# Patient Record
Sex: MALE | Race: WHITE | NOT HISPANIC OR LATINO | Employment: OTHER | URBAN - METROPOLITAN AREA
[De-identification: names, ages, dates, MRNs, and addresses within clinical notes are randomized per-mention and may not be internally consistent; named-entity substitution may affect disease eponyms.]

---

## 2017-01-03 ENCOUNTER — TELEPHONE (OUTPATIENT)
Dept: OPHTHALMOLOGY | Facility: CLINIC | Age: 72
End: 2017-01-03

## 2017-01-03 ENCOUNTER — OFFICE VISIT (OUTPATIENT)
Dept: OTOLARYNGOLOGY | Facility: CLINIC | Age: 72
End: 2017-01-03
Payer: COMMERCIAL

## 2017-01-03 VITALS
BODY MASS INDEX: 21.64 KG/M2 | DIASTOLIC BLOOD PRESSURE: 71 MMHG | TEMPERATURE: 99 F | SYSTOLIC BLOOD PRESSURE: 157 MMHG | WEIGHT: 150.81 LBS | HEART RATE: 81 BPM

## 2017-01-03 DIAGNOSIS — J32.0 CHRONIC MAXILLARY SINUSITIS: ICD-10-CM

## 2017-01-03 DIAGNOSIS — H91.90 PERCEIVED HEARING LOSS: ICD-10-CM

## 2017-01-03 DIAGNOSIS — H92.01 OTALGIA, RIGHT: Primary | ICD-10-CM

## 2017-01-03 DIAGNOSIS — M26.621 ARTHRALGIA OF RIGHT TEMPOROMANDIBULAR JOINT: ICD-10-CM

## 2017-01-03 DIAGNOSIS — Z94.4 HISTORY OF LIVER TRANSPLANT: ICD-10-CM

## 2017-01-03 PROCEDURE — 3078F DIAST BP <80 MM HG: CPT | Mod: S$GLB,,, | Performed by: ORTHOPAEDIC SURGERY

## 2017-01-03 PROCEDURE — 1125F AMNT PAIN NOTED PAIN PRSNT: CPT | Mod: S$GLB,,, | Performed by: ORTHOPAEDIC SURGERY

## 2017-01-03 PROCEDURE — 99999 PR PBB SHADOW E&M-EST. PATIENT-LVL III: CPT | Mod: PBBFAC,,, | Performed by: ORTHOPAEDIC SURGERY

## 2017-01-03 PROCEDURE — 1157F ADVNC CARE PLAN IN RCRD: CPT | Mod: S$GLB,,, | Performed by: ORTHOPAEDIC SURGERY

## 2017-01-03 PROCEDURE — 3077F SYST BP >= 140 MM HG: CPT | Mod: S$GLB,,, | Performed by: ORTHOPAEDIC SURGERY

## 2017-01-03 PROCEDURE — 1160F RVW MEDS BY RX/DR IN RCRD: CPT | Mod: S$GLB,,, | Performed by: ORTHOPAEDIC SURGERY

## 2017-01-03 PROCEDURE — 99214 OFFICE O/P EST MOD 30 MIN: CPT | Mod: S$GLB,,, | Performed by: ORTHOPAEDIC SURGERY

## 2017-01-03 PROCEDURE — 1159F MED LIST DOCD IN RCRD: CPT | Mod: S$GLB,,, | Performed by: ORTHOPAEDIC SURGERY

## 2017-01-03 RX ORDER — NEOMYCIN SULFATE, POLYMYXIN B SULFATE AND HYDROCORTISONE 10; 3.5; 1 MG/ML; MG/ML; [USP'U]/ML
3 SUSPENSION/ DROPS AURICULAR (OTIC) 3 TIMES DAILY
Qty: 10 ML | Refills: 0 | Status: SHIPPED | OUTPATIENT
Start: 2017-01-03 | End: 2017-01-17
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
## 2017-01-03 NOTE — MR AVS SNAPSHOT
[facility]  ENT  [address]  [place] [address]  Phone: [phone]  Fax: [phone]                  Abel Beckham   1/3/2017 4:00 PM   Office Visit    Description:  Male : 1945   Provider:  Arlette Swann MD   Department:  [facility] - ENT           Reason for Visit     Otalgia           Diagnoses this Visit        Comments    Otalgia, right    -  Primary     Arthralgia of right temporomandibular joint         Perceived hearing loss         History of liver transplant         Chronic maxillary sinusitis                To Do List           Future Appointments        Provider Department Dept Phone    2017 11:00 AM Indira Goodman [facility] - Audiology [phone]    1/10/2017 4:00 PM Natalio Richards MD [facility] [phone]    2017 11:15 AM Arlette Swann MD [facility] [phone]    2017 2:00 PM Alexis Morel MD [facility] - Rheumatology [phone]    5/10/2017 1:30 PM Harriet Westfall MD [facility] [phone]      Goals (5 Years of Data)     None       These Medications        Disp Refills Start End    neomycin-polymyxin-hydrocortisone (CORTISPORIN) 3.5-10,000-1 mg/mL-unit/mL-% otic suspension 10 mL 0 1/3/2017 2017    Place 3 drops into the right ear 3 (three) times daily. - Right Ear    Pharmacy: [facility] -  [facility]. Ph #: [phone]         [facility]     [facility] -  Assistance  Registered nurses in the [facility] provide clinical advisement, health education, appointment booking, and other advisory services.  Call for this free service at [phone].             Medications           Message regarding Medications     Verify the changes and/or additions to your medication regime listed below are the same as discussed with your clinician today.  If any of these changes or additions are incorrect, please notify  "your healthcare provider.        START taking these NEW medications        Refills    neomycin-polymyxin-hydrocortisone (CORTISPORIN) 3.5-10,000-1 mg/mL-unit/mL-% otic suspension 0    Sig: Place 3 drops into the right ear 3 (three) times daily.    Class: Normal    Route: Right Ear           Verify that the below list of medications is an accurate representation of the medications you are currently taking.  If none reported, the list may be blank. If incorrect, please contact your healthcare provider. Carry this list with you in case of emergency.           Current Medications     amlodipine (NORVASC) 10 MG tablet Take 10 mg by mouth once daily.    buPROPion (WELLBUTRIN) 100 MG tablet Take 1 tablet (100 mg total) by mouth once daily.    chlorhexidine (HIBICLENS) 4 % external liquid Apply topically daily as needed.    cyanocobalamin 1,000 mcg/mL injection 1000 micrograms subcutaneously daily x7 days, once weekly x4 weeks and once monthly for life.    DIPHENHYDRAMINE HCL (BENADRYL ORAL) Take 1 tablet by mouth as needed.    fludrocortisone (FLORINEF) 0.1 mg Tab Take 1 tablet (100 mcg total) by mouth once daily.    gabapentin (NEURONTIN) 300 MG capsule 2400 per day with max 3600    glucagon (human recombinant) inj 1mg/mL kit Inject 1 mL (1 mg total) into the muscle as needed (for low blood sugar).    indomethacin (INDOCIN) 25 MG capsule Take 1 capsule (25 mg total) by mouth 3 (three) times daily with meals.    insulin aspart (NOVOLOG) 100 unit/mL InPn pen Inject 9 Units into the skin 3 (three) times daily with meals. Plus sliding scale.    insulin detemir (LEVEMIR FLEXTOUCH) 100 unit/mL (3 mL) SubQ InPn pen Inject 21 Units into the skin every evening.    insulin needles, disposable, (BD ULTRA-FINE CONSUELO PEN NEEDLES) 32 x 5/32 " Ndle Inject 1 each into the skin 4 (four) times daily.    insulin NPH human recomb 100 unit/mL (3 mL) InPn Inject 9 Units into the skin every evening.    KIONEX 15 gram/60 mL Susp as needed.     " "multivitamin (THERAGRAN) tablet Take 1 tablet by mouth once daily.    mycophenolate (MYFORTIC) 180 MG TbEC Take 1 tablet (180 mg total) by mouth 2 (two) times daily. Liver Transplant ICD 10 code Z94.4    ondansetron (ZOFRAN) 4 MG tablet Take 1 tablet (4 mg total) by mouth every 8 (eight) hours as needed.    oxycodone-acetaminophen (PERCOCET) 7.5-325 mg per tablet Take 1 tablet by mouth every 4 (four) hours as needed for Pain.    pantoprazole (PROTONIX) 40 MG tablet Take 1 tablet (40 mg total) by mouth 2 (two) times daily.    pen needle, diabetic 32 gauge x 5/32" Ndle 1 each by Misc.(Non-Drug; Combo Route) route 5 (five) times daily.    predniSONE (DELTASONE) 5 MG tablet Take 1 tablet (5 mg total) by mouth once daily.    propranolol (INDERAL) 20 MG tablet Take 1 tablet (20 mg total) by mouth 3 (three) times daily.    quetiapine (SEROQUEL) 25 MG Tab Take 25 mg by mouth once daily.    ropinirole (REQUIP) 1 MG tablet Take 1 tablet (1 mg total) by mouth once daily.    tacrolimus (PROGRAF) 1 MG Cap Take 1 capsule (1 mg total) by mouth every 12 (twelve) hours.    tamsulosin (FLOMAX) 0.4 mg Cp24 Take 1 capsule (0.4 mg total) by mouth every evening.    tramadol (ULTRAM) 50 mg tablet Take 1 tablet (50 mg total) by mouth every 6 (six) hours as needed for Pain.    albuterol-ipratropium 2.5mg-0.5mg/3mL (DUO-NEB) 0.5 mg-3 mg(2.5 mg base)/3 mL nebulizer solution Take 3 mLs by nebulization every 4 (four) hours as needed for Wheezing.    neomycin-polymyxin-hydrocortisone (CORTISPORIN) 3.5-10,000-1 mg/mL-unit/mL-% otic suspension Place 3 drops into the right ear 3 (three) times daily.           Clinical Reference Information           Vital Signs - Last Recorded  Most recent update: 1/3/2017  4:24 PM by Christine Adams LPN    BP Pulse Temp Wt BMI    (!) 157/71 (BP Location: Left arm, Patient Position: Sitting, BP Method: Automatic) 81 98.5 °F (36.9 °C) (Tympanic) 68.4 kg (150 lb 12.7 oz) 21.64 kg/m2      Blood Pressure          Most " Recent Value    BP  (!)  157/71      Allergies as of 1/3/2017     Bactrim  [Sulfamethoxazole-trimethoprim]    Codeine    Sulfa (Sulfonamide Antibiotics)    Clindamycin      Immunizations Administered on Date of Encounter - 1/3/2017     None      Maintenance Dialysis History     Patient has no recorded history of maintenance dialysis.      Transplant Information        Txp Date Organ Coordinator Care Team    10/8/2013 Liver Eulalia Shi RN Surgeon:  Jay Devi MD   Referring Physician:  Isaac Watkins MD   Assisting Surgeon:  Abel Chance MD   Current Hepatologist:  Harriet Westfall MD   Corresponding Physician:  Suresh Ayoub MD   Current PCP:  Suresh Ayoub MD

## 2017-01-05 ENCOUNTER — LAB VISIT (OUTPATIENT)
Dept: LAB | Facility: HOSPITAL | Age: 72
End: 2017-01-05
Attending: INTERNAL MEDICINE
Payer: MEDICARE

## 2017-01-05 ENCOUNTER — TELEPHONE (OUTPATIENT)
Dept: GASTROENTEROLOGY | Facility: HOSPITAL | Age: 72
End: 2017-01-05

## 2017-01-05 ENCOUNTER — HOSPITAL ENCOUNTER (EMERGENCY)
Facility: HOSPITAL | Age: 72
Discharge: HOME | End: 2017-01-05
Attending: EMERGENCY MEDICINE
Payer: COMMERCIAL

## 2017-01-05 ENCOUNTER — TELEPHONE (OUTPATIENT)
Dept: GASTROENTEROLOGY | Facility: CLINIC | Age: 72
End: 2017-01-05

## 2017-01-05 VITALS
OXYGEN SATURATION: 96 % | DIASTOLIC BLOOD PRESSURE: 73 MMHG | HEIGHT: 70 IN | BODY MASS INDEX: 20.47 KG/M2 | SYSTOLIC BLOOD PRESSURE: 143 MMHG | RESPIRATION RATE: 16 BRPM | HEART RATE: 71 BPM | TEMPERATURE: 98 F | WEIGHT: 143 LBS

## 2017-01-05 DIAGNOSIS — R89.9 ABNORMAL LABORATORY TEST RESULT: Primary | ICD-10-CM

## 2017-01-05 DIAGNOSIS — Z94.4 LIVER TRANSPLANTED: ICD-10-CM

## 2017-01-05 LAB
ALBUMIN SERPL BCP-MCNC: 4.5 G/DL
ALP SERPL-CCNC: 83 U/L
ALT SERPL W/O P-5'-P-CCNC: 13 U/L
ANION GAP SERPL CALC-SCNC: 12 MMOL/L
ANION GAP SERPL CALC-SCNC: 12 MMOL/L
AST SERPL-CCNC: 20 U/L
BASOPHILS # BLD AUTO: 0.01 K/UL
BASOPHILS NFR BLD: 0.2 %
BILIRUB SERPL-MCNC: 0.9 MG/DL
BUN SERPL-MCNC: 32 MG/DL
BUN SERPL-MCNC: 32 MG/DL
CALCIUM SERPL-MCNC: 8.8 MG/DL
CALCIUM SERPL-MCNC: 9.5 MG/DL
CHLORIDE SERPL-SCNC: 103 MMOL/L
CHLORIDE SERPL-SCNC: 104 MMOL/L
CO2 SERPL-SCNC: 21 MMOL/L
CO2 SERPL-SCNC: 21 MMOL/L
CREAT SERPL-MCNC: 2.1 MG/DL
CREAT SERPL-MCNC: 2.3 MG/DL
DIFFERENTIAL METHOD: ABNORMAL
EOSINOPHIL # BLD AUTO: 0 K/UL
EOSINOPHIL NFR BLD: 0.4 %
ERYTHROCYTE [DISTWIDTH] IN BLOOD BY AUTOMATED COUNT: 13.2 %
EST. GFR  (AFRICAN AMERICAN): 32 ML/MIN/1.73 M^2
EST. GFR  (AFRICAN AMERICAN): 35.8 ML/MIN/1.73 M^2
EST. GFR  (NON AFRICAN AMERICAN): 28 ML/MIN/1.73 M^2
EST. GFR  (NON AFRICAN AMERICAN): 31 ML/MIN/1.73 M^2
GLUCOSE SERPL-MCNC: 159 MG/DL
GLUCOSE SERPL-MCNC: 249 MG/DL
HCT VFR BLD AUTO: 32 %
HGB BLD-MCNC: 10.5 G/DL
LYMPHOCYTES # BLD AUTO: 0.6 K/UL
LYMPHOCYTES NFR BLD: 11.8 %
MCH RBC QN AUTO: 31.2 PG
MCHC RBC AUTO-ENTMCNC: 32.8 %
MCV RBC AUTO: 95 FL
MONOCYTES # BLD AUTO: 0.3 K/UL
MONOCYTES NFR BLD: 4.7 %
NEUTROPHILS # BLD AUTO: 4.4 K/UL
NEUTROPHILS NFR BLD: 82.7 %
PLATELET # BLD AUTO: 124 K/UL
PMV BLD AUTO: 9.8 FL
POTASSIUM SERPL-SCNC: 5 MMOL/L
POTASSIUM SERPL-SCNC: 6.5 MMOL/L
PROT SERPL-MCNC: 7.2 G/DL
RBC # BLD AUTO: 3.37 M/UL
SODIUM SERPL-SCNC: 136 MMOL/L
SODIUM SERPL-SCNC: 137 MMOL/L
WBC # BLD AUTO: 5.33 K/UL

## 2017-01-05 PROCEDURE — 80197 ASSAY OF TACROLIMUS: CPT

## 2017-01-05 PROCEDURE — 80053 COMPREHEN METABOLIC PANEL: CPT

## 2017-01-05 PROCEDURE — 99283 EMERGENCY DEPT VISIT LOW MDM: CPT | Mod: 25

## 2017-01-05 PROCEDURE — 80048 BASIC METABOLIC PNL TOTAL CA: CPT

## 2017-01-05 PROCEDURE — 36415 COLL VENOUS BLD VENIPUNCTURE: CPT | Mod: PO

## 2017-01-05 PROCEDURE — 93005 ELECTROCARDIOGRAM TRACING: CPT

## 2017-01-05 PROCEDURE — 93010 ELECTROCARDIOGRAM REPORT: CPT | Mod: ,,, | Performed by: INTERNAL MEDICINE

## 2017-01-05 PROCEDURE — 85025 COMPLETE CBC W/AUTO DIFF WBC: CPT

## 2017-01-05 NOTE — ED AVS SNAPSHOT
[facility]  [address]  [place] [address]               Abel Beckham   2017  6:53 PM   ED    Description:  Male : 1945   Department:  [facility]           Your Care was Coordinated By:     Provider Role From To    Kyle Jett MD Attending Provider 17 3723 --      Reason for Visit     Abnormal Lab           Diagnoses this Visit        Comments    Abnormal laboratory test result    -  Primary Resolved      ED Disposition     None           To Do List           Follow-up Information     Follow up with Harriet Westfall MD In 1 day.    Specialty:  Gastroenterology    Why:  Keep appointment with Dr. Westfall tomorrow as scheduled    Contact information:    [address] [place] [address]  [phone]        [facility]     [facility] Care [facility] -  Assistance  Registered nurses in the [facility] provide clinical advisement, health education, appointment booking, and other advisory services.  Call for this free service at [phone].             Medications           Message regarding Medications     Verify the changes and/or additions to your medication regime listed below are the same as discussed with your clinician today.  If any of these changes or additions are incorrect, please notify your healthcare provider.             Verify that the below list of medications is an accurate representation of the medications you are currently taking.  If none reported, the list may be blank. If incorrect, please contact your healthcare provider. Carry this list with you in case of emergency.           Current Medications     albuterol-ipratropium 2.5mg-0.5mg/3mL (DUO-NEB) 0.5 mg-3 mg(2.5 mg base)/3 mL nebulizer solution Take 3 mLs by nebulization every 4 (four) hours as needed for Wheezing.    amlodipine (NORVASC) 10 MG tablet Take 10 mg by mouth once daily.    buPROPion (WELLBUTRIN) 100 MG  "tablet Take 1 tablet (100 mg total) by mouth once daily.    chlorhexidine (HIBICLENS) 4 % external liquid Apply topically daily as needed.    cyanocobalamin 1,000 mcg/mL injection 1000 micrograms subcutaneously daily x7 days, once weekly x4 weeks and once monthly for life.    DIPHENHYDRAMINE HCL (BENADRYL ORAL) Take 1 tablet by mouth as needed.    fludrocortisone (FLORINEF) 0.1 mg Tab Take 1 tablet (100 mcg total) by mouth once daily.    gabapentin (NEURONTIN) 300 MG capsule 2400 per day with max 3600    glucagon (human recombinant) inj 1mg/mL kit Inject 1 mL (1 mg total) into the muscle as needed (for low blood sugar).    indomethacin (INDOCIN) 25 MG capsule Take 1 capsule (25 mg total) by mouth 3 (three) times daily with meals.    insulin aspart (NOVOLOG) 100 unit/mL InPn pen Inject 9 Units into the skin 3 (three) times daily with meals. Plus sliding scale.    insulin detemir (LEVEMIR FLEXTOUCH) 100 unit/mL (3 mL) SubQ InPn pen Inject 21 Units into the skin every evening.    insulin needles, disposable, (BD ULTRA-FINE CONSUELO PEN NEEDLES) 32 x 5/32 " Ndle Inject 1 each into the skin 4 (four) times daily.    insulin NPH human recomb 100 unit/mL (3 mL) InPn Inject 9 Units into the skin every evening.    KIONEX 15 gram/60 mL Susp as needed.     multivitamin (THERAGRAN) tablet Take 1 tablet by mouth once daily.    mycophenolate (MYFORTIC) 180 MG TbEC Take 1 tablet (180 mg total) by mouth 2 (two) times daily. Liver Transplant ICD 10 code Z94.4    neomycin-polymyxin-hydrocortisone (CORTISPORIN) 3.5-10,000-1 mg/mL-unit/mL-% otic suspension Place 3 drops into the right ear 3 (three) times daily.    ondansetron (ZOFRAN) 4 MG tablet Take 1 tablet (4 mg total) by mouth every 8 (eight) hours as needed.    oxycodone-acetaminophen (PERCOCET) 7.5-325 mg per tablet Take 1 tablet by mouth every 4 (four) hours as needed for Pain.    pantoprazole (PROTONIX) 40 MG tablet Take 1 tablet (40 mg total) by mouth 2 (two) times daily.    pen " "needle, diabetic 32 gauge x 5/32" Ndle 1 each by Misc.(Non-Drug; Combo Route) route 5 (five) times daily.    predniSONE (DELTASONE) 5 MG tablet Take 1 tablet (5 mg total) by mouth once daily.    propranolol (INDERAL) 20 MG tablet Take 1 tablet (20 mg total) by mouth 3 (three) times daily.    quetiapine (SEROQUEL) 25 MG Tab Take 25 mg by mouth once daily.    ropinirole (REQUIP) 1 MG tablet Take 1 tablet (1 mg total) by mouth once daily.    tacrolimus (PROGRAF) 1 MG Cap Take 1 capsule (1 mg total) by mouth every 12 (twelve) hours.    tamsulosin (FLOMAX) 0.4 mg Cp24 Take 1 capsule (0.4 mg total) by mouth every evening.    tramadol (ULTRAM) 50 mg tablet Take 1 tablet (50 mg total) by mouth every 6 (six) hours as needed for Pain.           Clinical Reference Information           Your Vitals Were     BP Pulse Temp Resp Height Weight    168/67 (BP Location: Right arm, Patient Position: Sitting) 80 98.2 °F (36.8 °C) (Oral) 14 5' 10" (1.778 m) 64.9 kg (143 lb)    SpO2 BMI             93% 20.52 kg/m2         Allergies as of 1/5/2017        Reactions    Bactrim  [Sulfamethoxazole-trimethoprim] Hives    Codeine Nausea And Vomiting    Pt stated this med gave him convulsions and vomiting    Sulfa (Sulfonamide Antibiotics) Hives, Itching    Clindamycin Rash      Immunizations Administered on Date of Encounter - 1/5/2017     None      ED Micro, Lab, POCT     Start Ordered       Status Ordering Provider    01/05/17 1855 01/05/17 1854  Basic metabolic panel  STAT      Final result       ED Imaging Orders     None      Your Scheduled Appointments     Jan 06, 2017  8:00 AM CST   Transplant Follow-Up with Harriet Westfall MD   [facility] ([facility])    [address] [place] [id number] [phone]            Hernandez 10, 2017  4:00 PM CST   Established Patient Visit with MD [facility] - Ophthalmology ([facility])    [address] [place] [id number] [phone]               [facility]  " complies with applicable Federal civil rights laws and does not discriminate on the basis of race, color, national origin, age, disability, or sex.        Language Assistance Services     ATTENTION: Language assistance services are available, free of charge. Please call [phone].      ATENCIÓN: Si habla español, tiene a del castillo disposición servicios gratuitos de asistencia lingüística. Llame al [phone].     CHÚ Ý: N?u b?n nói Ti?ng Vi?t, có các d?ch v? h? tr? ngôn ng? mi?n phí dành cho b?n. G?i s? [phone].

## 2017-01-05 NOTE — TELEPHONE ENCOUNTER
----- Message from Maribell Mitchell sent at 1/3/2017  9:08 AM CST -----  Contact: Rufina- wife  Will be moving out of [place] soon.  Needs a checkup before leaving.  Would like to come in this week or next week.  First available appt desk pulls up is Feb. 8th.  Please call her at [phone]. Also needs to be set up for blood work.

## 2017-01-06 ENCOUNTER — TELEPHONE (OUTPATIENT)
Dept: TRANSPLANT | Facility: CLINIC | Age: 72
End: 2017-01-06

## 2017-01-06 ENCOUNTER — OFFICE VISIT (OUTPATIENT)
Dept: GASTROENTEROLOGY | Facility: CLINIC | Age: 72
End: 2017-01-06
Payer: COMMERCIAL

## 2017-01-06 VITALS
HEART RATE: 94 BPM | DIASTOLIC BLOOD PRESSURE: 82 MMHG | BODY MASS INDEX: 20.89 KG/M2 | HEIGHT: 70 IN | SYSTOLIC BLOOD PRESSURE: 158 MMHG | WEIGHT: 145.94 LBS

## 2017-01-06 DIAGNOSIS — Z94.4 LIVER TRANSPLANTED: ICD-10-CM

## 2017-01-06 DIAGNOSIS — E87.5 HYPERKALEMIA: Primary | ICD-10-CM

## 2017-01-06 DIAGNOSIS — D84.9 IMMUNOSUPPRESSION: ICD-10-CM

## 2017-01-06 LAB — TACROLIMUS BLD-MCNC: 5.5 NG/ML

## 2017-01-06 PROCEDURE — 1159F MED LIST DOCD IN RCRD: CPT | Mod: S$GLB,,, | Performed by: INTERNAL MEDICINE

## 2017-01-06 PROCEDURE — 1126F AMNT PAIN NOTED NONE PRSNT: CPT | Mod: S$GLB,,, | Performed by: INTERNAL MEDICINE

## 2017-01-06 PROCEDURE — 1160F RVW MEDS BY RX/DR IN RCRD: CPT | Mod: S$GLB,,, | Performed by: INTERNAL MEDICINE

## 2017-01-06 PROCEDURE — 3079F DIAST BP 80-89 MM HG: CPT | Mod: S$GLB,,, | Performed by: INTERNAL MEDICINE

## 2017-01-06 PROCEDURE — 99999 PR PBB SHADOW E&M-EST. PATIENT-LVL III: CPT | Mod: PBBFAC,,, | Performed by: INTERNAL MEDICINE

## 2017-01-06 PROCEDURE — 99213 OFFICE O/P EST LOW 20 MIN: CPT | Mod: S$GLB,,, | Performed by: INTERNAL MEDICINE

## 2017-01-06 PROCEDURE — 1157F ADVNC CARE PLAN IN RCRD: CPT | Mod: S$GLB,,, | Performed by: INTERNAL MEDICINE

## 2017-01-06 PROCEDURE — 3077F SYST BP >= 140 MM HG: CPT | Mod: S$GLB,,, | Performed by: INTERNAL MEDICINE

## 2017-01-06 NOTE — ED NOTES
Pt reports having labs drawn this morning and his potassium level was 6.8. Pt states that he was informed to come to the ER.

## 2017-01-06 NOTE — ED PROVIDER NOTES
SCRIBE #1 NOTE: I, Rani Carnes, am scribing for, and in the presence of, Kyle Jett MD. I have scribed the entire note.      History      Chief Complaint   Patient presents with    Abnormal Lab     pt reports that he was seen by Dr. Westfall and had labs drawn today; told that potassium is 6.8.  Pt denies complaints       Review of patient's allergies indicates:   Allergen Reactions    Bactrim  [sulfamethoxazole-trimethoprim] Hives    Codeine Nausea And Vomiting     Pt stated this med gave him convulsions and vomiting    Sulfa (sulfonamide antibiotics) Hives and Itching    Clindamycin Rash        HPI   HPI    1/5/2017, 7:34 PM   History obtained from the patient      History of Present Illness: Abel Beckham is a 71 y.o. male patient who presents to the Emergency Department for abnormal lab results which onset gradually today. Symptoms are constant and moderate in severity. Pt was instructed by Dr. Westfall to get blood work completed. Blood works shows potassium reading of 6.8 and was instructed to come to ER. Pt has PMHx of liver transplant with rejection episodes. No mitigating or exacerbating factors reported. No associated sxs. Patient denies any n/v/d, fever, CP, SOB, palpitations, leg swelling, abdominal pain, back pain, dysuria, hematuria, HA, dizziness, and all other sxs at this time. No further complaints or concerns at this time.       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    Renal insufficiency      dr hickey    Retinitis pigmentosa RP     RP    Sepsis due to Pseudomonas      sinusitis    Sleep apnea     Squamous cell carcinoma 2/2015     left mid-back    Transplant recipient     Type 2 diabetes mellitus with renal manifestations     Type 2 diabetes, controlled, with neuropathy     Type 2 diabetes, uncontrolled, with neuropathy        Past Surgical History:  Past Surgical History   Procedure Laterality Date    Appendectomy      Tonsillectomy      Orchiectomy      Cholecystectomy      Hiatal hernia repair      Prostate surgery      Orchiectomy      Esophagogastroduodenoscopy      Esophageal varice ligation      Nissen fundoplication      Liver transplant      Pr exploratory of abdomen  10/10/2013     expl lap, re stomach torn while doing liver transplant on 10/9    Pr exploratory of abdomen  10/22/2013     2nd exp lap for perforated stomach during liver transplant surgery    Pr exploratory of abdomen  10/25/2013     3rd expl lap for gastric perf during liver transplant on 10/9/13    Abdominal surgery      Hernia repair           Family History:  Family History   Problem Relation Age of Onset    Rheumatic fever Mother     Heart disease Mother     Cancer Sister     Hypertension Father     Melanoma Brother     Melanoma Paternal Grandfather        Social History:  Social History     Social History Main Topics    Smoking status: Never Smoker    Smokeless tobacco: Never Used    Alcohol use No    Drug use: No    Sexual activity: Yes     Partners: Female       ROS   Review of Systems   Constitutional: Negative for fever.        (+) abnormal lab results    HENT: Negative for sore throat.    Respiratory: Negative for shortness of breath.    Cardiovascular: Negative for chest pain, palpitations and leg swelling.   Gastrointestinal: Negative for abdominal pain, diarrhea, nausea and vomiting.   Genitourinary: Negative for dysuria, flank pain and hematuria.   Musculoskeletal: Negative for back pain.   Skin: Negative for rash.   Neurological: Negative for dizziness, weakness and headaches.   Hematological: Does not bruise/bleed easily.       Physical Exam    Initial Vitals   BP Pulse Resp Temp SpO2   01/05/17 1847 01/05/17 1847 01/05/17 1847 01/05/17 1847 01/05/17 1847   168/67 89 14 98.2 °F (36.8 °C) 93 %      Physical Exam  Nursing Notes and Vital Signs Reviewed.  Constitutional: Patient is in no apparent distress. Awake and alert. Well-developed and well-nourished.  Head: Atraumatic. Normocephalic.  Eyes: PERRL. EOM intact. Conjunctivae are not pale. No scleral icterus.  ENT: Mucous membranes are moist. Oropharynx is clear and symmetric.    Neck: Supple. Full ROM. No lymphadenopathy.  Cardiovascular: Regular rate. Regular rhythm. No murmurs, rubs, or gallops. Distal pulses are 2+ and symmetric.  Pulmonary/Chest: No respiratory distress. Clear to auscultation bilaterally. No wheezing, rales, or rhonchi.  Abdominal: Soft and non-distended.  There is no tenderness.  No rebound, guarding, or rigidity.  Good bowel sounds.    Musculoskeletal: Moves all extremities. No obvious deformities. No edema. No calf tenderness.  Skin: Warm and dry.  Neurological:  Alert, awake, and appropriate.  Normal speech.  No acute focal neurological deficits are appreciated.  Psychiatric: Normal affect. Good eye contact. Appropriate in content.    ED Course    Procedures  ED Vital Signs:  Vitals:    01/05/17 1847 01/05/17 1859 01/05/17 2025   BP: (!) 168/67  (!) 143/73   Pulse: 89 80 71   Resp: 14  16   Temp: 98.2 °F (36.8 °C)     TempSrc: Oral     SpO2: (!) 93%  96%   Weight: 64.9 kg (143 lb)    "  Height: 5' 10" (1.778 m)         Abnormal Lab Results:  Labs Reviewed   BASIC METABOLIC PANEL - Abnormal; Notable for the following:        Result Value    CO2 21 (*)     Glucose 249 (*)     BUN, Bld 32 (*)     Creatinine 2.3 (*)     eGFR if  32 (*)     eGFR if non  28 (*)     All other components within normal limits        All Lab Results:  Results for orders placed or performed during the hospital encounter of 01/05/17   Basic metabolic panel   Result Value Ref Range    Sodium 136 136 - 145 mmol/L    Potassium 5.0 3.5 - 5.1 mmol/L    Chloride 103 95 - 110 mmol/L    CO2 21 (L) 23 - 29 mmol/L    Glucose 249 (H) 70 - 110 mg/dL    BUN, Bld 32 (H) 8 - 23 mg/dL    Creatinine 2.3 (H) 0.5 - 1.4 mg/dL    Calcium 8.8 8.7 - 10.5 mg/dL    Anion Gap 12 8 - 16 mmol/L    eGFR if African American 32 (A) >60 mL/min/1.73 m^2    eGFR if non African American 28 (A) >60 mL/min/1.73 m^2     *Note: Due to a large number of results and/or encounters for the requested time period, some results have not been displayed. A complete set of results can be found in Results Review.          The EKG was ordered, reviewed, and independently interpreted by the ED provider.  Interpretation time: 18:59  Rate: 80 BPM  Rhythm: normal sinus rhythm  Interpretation: No peaked T waves. No STEMI.           The Emergency Provider reviewed the vital signs and test results, which are outlined above.    ED Discussion     8:20 PM: Reassessed pt at this time. Discussed with pt all pertinent ED information and results. Discussed pt dx and plan of tx. Gave pt all f/u and return to the ED instructions. All questions and concerns were addressed at this time. Pt expresses understanding of information and instructions, and is comfortable with plan to discharge. Pt is stable for discharge.    Pre-hypertension/Hypertension: The pt has been informed that they may have pre-hypertension or hypertension based on a blood pressure reading in " the ED. I recommend that the pt call the PCP listed on their discharge instructions or a physician of their choice this week to arrange f/u for further evaluation of possible pre-hypertension or hypertension.     ED Medication(s):  Medications - No data to display    Discharge Medication List as of 1/5/2017  8:13 PM          Follow-up Information     Follow up with Harriet Westfall MD In 1 day.    Specialty:  Gastroenterology    Why:  Keep appointment with Dr. Westfall tomorrow as scheduled    Contact information:    [address]  [place] [address] [phone]              Medical Decision Making    Medical Decision Making:   Clinical Tests:   Lab Tests: Ordered and Reviewed  Medical Tests: Ordered and Reviewed           Scribe Attestation:   Scribe #1: I performed the above scribed service and the documentation accurately describes the services I performed. I attest to the accuracy of the note.    Attending:   Physician Attestation Statement for Scribe #1: I, Kyle Jett MD, personally performed the services described in this documentation, as scribed by Rani Carnes, in my presence, and it is both accurate and complete.          Clinical Impression       ICD-10-CM ICD-9-CM   1. Abnormal laboratory test result R89.9 796.4       Disposition:   Disposition: Discharged  Condition: Stable         Kyle Jett MD  01/09/17 4347

## 2017-01-06 NOTE — PROGRESS NOTES
Transplant Hepatology  Liver Transplant Recipient Follow-up    Transplant Date: 10/8/2013  UNOS Native Liver Dx: Cirrhosis: Cryptogenic (Idiopathic)    Abel is here for follow up of his liver transplant.    ORGAN: LIVER  Whole or Partial: whole liver  Donor Type:  - brain death  CDC High Risk: no  Donor CMV Status: positive  Donor HCV Status: negative  Donor HBcAb: negative  Biliary Anastomosis: end to end  Arterial Anatomy: standard  IVC reconstruction: end to end ivc  Portal vein status: patent  .    Subjective:     Interval History:  Currently, he is doing adequately. Current complaints include reporting that he will be moving to [place] soon.  This will be a permanent move.  He would like to know what he needs to transfer his transplant care.  Or if there is a way for sick continue to manage him from a far.  He denies any acute issues at this time but of note he did have a potassium on his labs from yesterday that was read as 6.5.  He was sent to the emergency department and repeat potassium was 5.0.  Significant frustration about the fact that he was sent to the emergency department and potassium ended up being normal.    No evidence of liver decompensation: no ascites, confusion or GI bleeding.      Review of Systems    Objective:     Physical Exam    WBC   Date Value Ref Range Status   2017 5.33 3.90 - 12.70 K/uL Final     Hemoglobin   Date Value Ref Range Status   2017 10.5 (L) 14.0 - 18.0 g/dL Final     POC Hematocrit   Date Value Ref Range Status   10/25/2013 34 (L) 36 - 54 %PCV Final     Hematocrit   Date Value Ref Range Status   2017 32.0 (L) 40.0 - 54.0 % Final     Platelets   Date Value Ref Range Status   2017 124 (L) 150 - 350 K/uL Final     BUN, Bld   Date Value Ref Range Status   2017 32 (H) 8 - 23 mg/dL Final     Creatinine   Date Value Ref Range Status   2017 2.3 (H) 0.5 - 1.4 mg/dL Final     Glucose   Date Value Ref Range Status   2017 249 (H)  70 - 110 mg/dL Final     Calcium   Date Value Ref Range Status   01/05/2017 8.8 8.7 - 10.5 mg/dL Final     Sodium   Date Value Ref Range Status   01/05/2017 136 136 - 145 mmol/L Final     Potassium   Date Value Ref Range Status   01/05/2017 5.0 3.5 - 5.1 mmol/L Final     Chloride   Date Value Ref Range Status   01/05/2017 103 95 - 110 mmol/L Final     Magnesium   Date Value Ref Range Status   12/12/2016 2.2 1.6 - 2.6 mg/dL Final     AST   Date Value Ref Range Status   01/05/2017 20 10 - 40 U/L Final     ALT   Date Value Ref Range Status   01/05/2017 13 10 - 44 U/L Final     Alkaline Phosphatase   Date Value Ref Range Status   01/05/2017 83 55 - 135 U/L Final     Total Bilirubin   Date Value Ref Range Status   01/05/2017 0.9 0.1 - 1.0 mg/dL Final     Comment:     For infants and newborns, interpretation of results should be based  on gestational age, weight and in agreement with clinical  observations.  Premature Infant recommended reference ranges:  Up to 24 hours.............<8.0 mg/dL  Up to 48 hours............<12.0 mg/dL  3-5 days..................<15.0 mg/dL  6-29 days.................<15.0 mg/dL       Albumin   Date Value Ref Range Status   01/05/2017 4.5 3.5 - 5.2 g/dL Final   05/14/2013 3.9 3.6 - 5.1 g/dL Final     INR   Date Value Ref Range Status   05/12/2016 0.9 0.8 - 1.2 Final     Comment:     Coumadin Therapy:  2.0 - 3.0 for INR for all indicators except mechanical heart valves  and antiphospholipid syndromes which should use 2.5 - 3.5.       Lab Results   Component Value Date    TACROLIMUS 3.3 (L) 11/15/2016    SIROLIMUSLEV <2.0 (L) 12/12/2016           Assessment/Plan:     1. Hyperkalemia    2. Liver transplanted    3. Immunosuppression      Hyperkalemia-uncertainty original potassium was a correct value.  He denies taking any insulin during the day yesterday.  It is currently resolved.    Liver transplanted-he will be moving to [place].  I expressed that we can likely transfer him to another  transplant center or if we can still manage him that he needs a local hepatologist at least.  -Strongly advised that he not move until his all of his doctors in place given his complex medical history  -Will try to investigate where patient can transfer care; he will also investigate    Immunosuppression-awaiting tacrolimus level from today.  Patient is still on prednisone would like to taper off unless someone else started this for another reason.    RTC as previously scheduled if still in BTR    [facility]- liver transplant clinic [phone]; GI clinic [phone]; liver disease referral center [phone]    Patient was seen in the liver transplant department at [facility].    Harriet Westfall MD         [facility] Patient Status  Functional Status: 70% - Cares for self: unable to carry on normal activity or active work  Physical Capacity: No Limitations

## 2017-01-06 NOTE — TELEPHONE ENCOUNTER
----- Message from Harriet Westfall MD sent at 1/6/2017  8:14 AM CST -----  Patient was sent to ED for elevated potassium repeat at ED was ok.  Liver tessts ok awaiting tacro level. Would like to get patient off pred.

## 2017-01-09 ENCOUNTER — TELEPHONE (OUTPATIENT)
Dept: TRANSPLANT | Facility: CLINIC | Age: 72
End: 2017-01-09

## 2017-01-09 NOTE — TELEPHONE ENCOUNTER
----- Message from Harriet Westfall MD sent at 1/6/2017  3:13 PM CST -----  Tacrolimus level is adequate but relatively up from patient's usual and this may have resulted in some hyperkalemia and increase in creatinine.  Would recommend repeating labs next week.  Also need to confirm with patient whether not he is still taking the prednisone.

## 2017-01-10 ENCOUNTER — OFFICE VISIT (OUTPATIENT)
Dept: OPHTHALMOLOGY | Facility: CLINIC | Age: 72
End: 2017-01-10
Payer: COMMERCIAL

## 2017-01-10 DIAGNOSIS — H27.02 APHAKIA, LEFT: ICD-10-CM

## 2017-01-10 DIAGNOSIS — S05.8X9A TRAUMATIC ANIRIDIA: Primary | ICD-10-CM

## 2017-01-10 DIAGNOSIS — H20.9 IRITIS: ICD-10-CM

## 2017-01-10 DIAGNOSIS — H18.20: ICD-10-CM

## 2017-01-10 PROCEDURE — 99999 PR PBB SHADOW E&M-EST. PATIENT-LVL II: CPT | Mod: PBBFAC,,, | Performed by: OPHTHALMOLOGY

## 2017-01-10 PROCEDURE — 92012 INTRM OPH EXAM EST PATIENT: CPT | Mod: S$GLB,,, | Performed by: OPHTHALMOLOGY

## 2017-01-10 RX ORDER — DIFLUPREDNATE OPHTHALMIC 0.5 MG/ML
1 EMULSION OPHTHALMIC 4 TIMES DAILY
Qty: 1 BOTTLE | Refills: 2 | Status: SHIPPED | OUTPATIENT
Start: 2017-01-10 | End: 2017-02-09

## 2017-01-10 NOTE — PROGRESS NOTES
HPI     F/u iritis from trauma OS.  No pain or light sensitivity OS.      He is moving to [place], [place] at the end of the week.  He has   another appointment with KIMBERLY on 01/17/17.  He would like referral to Dr. 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 Richards  F/u trauma OS.      PCIOL OS 04/03/14 + 21.0 WF/CDE 23  PCIOL OD 04/17/14 + 20.0/CDE 30.07 SN6AT4  liver transplant bx proven diabetic nephropathy Pain syndrome htn   gastroparesis    Durezol qid OS out as of today.       Last edited by Lizette Jacobson on 1/10/2017  4:15 PM.         Assessment /Plan     For exam results, see Encounter Report.      ICD-10-CM ICD-9-CM    1. Traumatic aniridia- Left eye  S05.8X9A 743.45 Slight Improvement will cont Durezol QID OS  Recommend Follow up care with [facility] due to patient moving there this week   2. Aphakia, left H27.02 379.31 Stable   3. Iritis H20.9 364.3 Stable, still no change with Cornea Edema   4. Corneal edema, unspecified H18.20 371.20 As above       Pt will Follow Up with [facility]

## 2017-01-10 NOTE — MR AVS SNAPSHOT
[facility] - Ophthalmology  [address] [place] [address]  Phone: [phone]  Fax: [phone]                  Abel Beckham   1/10/2017 4:00 PM   Office Visit    Description:  Male : 1945   Provider:  Natalio Richards MD   Department:  [facility] - Ophthalmology           Reason for Visit     Eye Injury           Diagnoses this Visit        Comments    Traumatic aniridia    -  Primary     Aphakia, left         Iritis         Corneal edema, unspecified                To Do List           Goals (5 Years of Data)     None       These Medications        Disp Refills Start End    difluprednate (DUREZOL) 0.05 % Drop ophthalmic solution 1 Bottle 2 1/10/2017 2017    Place 1 drop into the left eye 4 (four) times daily. - Left Eye    Pharmacy: [id number] [place] - [address].  #: [phone]         [facility] On Call     [facility] On Call Nurse Care Line -  Assistance  Registered nurses in the [facility] provide clinical advisement, health education, appointment booking, and other advisory services.  Call for this free service at [phone].             Medications           Message regarding Medications     Verify the changes and/or additions to your medication regime listed below are the same as discussed with your clinician today.  If any of these changes or additions are incorrect, please notify your healthcare provider.        START taking these NEW medications        Refills    difluprednate (DUREZOL) 0.05 % Drop ophthalmic solution 2    Sig: Place 1 drop into the left eye 4 (four) times daily.    Class: Print    Route: Left Eye           Verify that the below list of medications is an accurate representation of the medications you are currently taking.  If none reported, the list may be blank. If incorrect, please contact your healthcare provider. Carry this list with you in case of emergency.           Current  "Medications     amlodipine (NORVASC) 10 MG tablet Take 10 mg by mouth once daily.    buPROPion (WELLBUTRIN) 100 MG tablet Take 1 tablet (100 mg total) by mouth once daily.    chlorhexidine (HIBICLENS) 4 % external liquid Apply topically daily as needed.    cyanocobalamin 1,000 mcg/mL injection 1000 micrograms subcutaneously daily x7 days, once weekly x4 weeks and once monthly for life.    DIPHENHYDRAMINE HCL (BENADRYL ORAL) Take 1 tablet by mouth as needed.    fludrocortisone (FLORINEF) 0.1 mg Tab Take 1 tablet (100 mcg total) by mouth once daily.    gabapentin (NEURONTIN) 300 MG capsule 2400 per day with max 3600    glucagon (human recombinant) inj 1mg/mL kit Inject 1 mL (1 mg total) into the muscle as needed (for low blood sugar).    indomethacin (INDOCIN) 25 MG capsule Take 1 capsule (25 mg total) by mouth 3 (three) times daily with meals.    insulin aspart (NOVOLOG) 100 unit/mL InPn pen Inject 9 Units into the skin 3 (three) times daily with meals. Plus sliding scale.    insulin detemir (LEVEMIR FLEXTOUCH) 100 unit/mL (3 mL) SubQ InPn pen Inject 21 Units into the skin every evening.    insulin needles, disposable, (BD ULTRA-FINE CONSUELO PEN NEEDLES) 32 x 5/32 " Ndle Inject 1 each into the skin 4 (four) times daily.    insulin NPH human recomb 100 unit/mL (3 mL) InPn Inject 9 Units into the skin every evening.    KIONEX 15 gram/60 mL Susp as needed.     multivitamin (THERAGRAN) tablet Take 1 tablet by mouth once daily.    mycophenolate (MYFORTIC) 180 MG TbEC Take 1 tablet (180 mg total) by mouth 2 (two) times daily. Liver Transplant ICD 10 code Z94.4    neomycin-polymyxin-hydrocortisone (CORTISPORIN) 3.5-10,000-1 mg/mL-unit/mL-% otic suspension Place 3 drops into the right ear 3 (three) times daily.    ondansetron (ZOFRAN) 4 MG tablet Take 1 tablet (4 mg total) by mouth every 8 (eight) hours as needed.    oxycodone-acetaminophen (PERCOCET) 7.5-325 mg per tablet Take 1 tablet by mouth every 4 (four) hours as needed " "for Pain.    pantoprazole (PROTONIX) 40 MG tablet Take 1 tablet (40 mg total) by mouth 2 (two) times daily.    pen needle, diabetic 32 gauge x 5/32" Ndle 1 each by Misc.(Non-Drug; Combo Route) route 5 (five) times daily.    predniSONE (DELTASONE) 5 MG tablet Take 1 tablet (5 mg total) by mouth once daily.    propranolol (INDERAL) 20 MG tablet Take 1 tablet (20 mg total) by mouth 3 (three) times daily.    quetiapine (SEROQUEL) 25 MG Tab Take 25 mg by mouth once daily.    ropinirole (REQUIP) 1 MG tablet Take 1 tablet (1 mg total) by mouth once daily.    tacrolimus (PROGRAF) 1 MG Cap Take 1 capsule (1 mg total) by mouth every 12 (twelve) hours.    tamsulosin (FLOMAX) 0.4 mg Cp24 Take 1 capsule (0.4 mg total) by mouth every evening.    tramadol (ULTRAM) 50 mg tablet Take 1 tablet (50 mg total) by mouth every 6 (six) hours as needed for Pain.    albuterol-ipratropium 2.5mg-0.5mg/3mL (DUO-NEB) 0.5 mg-3 mg(2.5 mg base)/3 mL nebulizer solution Take 3 mLs by nebulization every 4 (four) hours as needed for Wheezing.    difluprednate (DUREZOL) 0.05 % Drop ophthalmic solution Place 1 drop into the left eye 4 (four) times daily.           Clinical Reference Information           Allergies as of 1/10/2017     Bactrim  [Sulfamethoxazole-trimethoprim]    Codeine    Sulfa (Sulfonamide Antibiotics)    Clindamycin      Immunizations Administered on Date of Encounter - 1/10/2017     None      Maintenance Dialysis History     Patient has no recorded history of maintenance dialysis.      Transplant Information        Txp Date Organ Coordinator Care Team    10/8/2013 Liver Eulalia Shi RN Surgeon:  Jay Devi MD   Referring Physician:  Isaac Watkins MD   Assisting Surgeon:  Abel Chance MD   Current Hepatologist:  Harriet Westfall MD   Corresponding Physician:  Suresh Ayoub MD   Current PCP:  Suresh Ayoub MD         "

## 2017-01-11 ENCOUNTER — HOSPITAL ENCOUNTER (OUTPATIENT)
Dept: RADIOLOGY | Facility: HOSPITAL | Age: 72
Discharge: HOME | End: 2017-01-11
Attending: FAMILY MEDICINE
Payer: COMMERCIAL

## 2017-01-11 ENCOUNTER — OFFICE VISIT (OUTPATIENT)
Dept: URGENT CARE | Facility: CLINIC | Age: 72
End: 2017-01-11
Payer: COMMERCIAL

## 2017-01-11 VITALS
DIASTOLIC BLOOD PRESSURE: 80 MMHG | TEMPERATURE: 98 F | RESPIRATION RATE: 22 BRPM | HEIGHT: 70 IN | SYSTOLIC BLOOD PRESSURE: 160 MMHG | OXYGEN SATURATION: 98 % | WEIGHT: 148.94 LBS | HEART RATE: 80 BPM | BODY MASS INDEX: 21.32 KG/M2

## 2017-01-11 DIAGNOSIS — S93.402A SPRAIN OF LEFT ANKLE, UNSPECIFIED LIGAMENT, INITIAL ENCOUNTER: ICD-10-CM
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
## 2017-01-11 DIAGNOSIS — T07.XXXA MULTIPLE CONTUSIONS: ICD-10-CM

## 2017-01-11 DIAGNOSIS — T07.XXXA MULTIPLE ABRASIONS: ICD-10-CM
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
## 2017-01-11 PROCEDURE — 73610 X-RAY EXAM OF ANKLE: CPT | Mod: 26,LT,, | Performed by: RADIOLOGY

## 2017-01-11 PROCEDURE — 73090 X-RAY EXAM OF FOREARM: CPT | Mod: 26,RT,, | Performed by: RADIOLOGY

## 2017-01-11 PROCEDURE — 3077F SYST BP >= 140 MM HG: CPT | Mod: S$GLB,,, | Performed by: PHYSICIAN ASSISTANT

## 2017-01-11 PROCEDURE — 73130 X-RAY EXAM OF HAND: CPT | Mod: TC,PO,RT

## 2017-01-11 PROCEDURE — 3079F DIAST BP 80-89 MM HG: CPT | Mod: S$GLB,,, | Performed by: PHYSICIAN ASSISTANT

## 2017-01-11 PROCEDURE — 99214 OFFICE O/P EST MOD 30 MIN: CPT | Mod: S$GLB,,, | Performed by: PHYSICIAN ASSISTANT

## 2017-01-11 PROCEDURE — 73130 X-RAY EXAM OF HAND: CPT | Mod: 26,RT,, | Performed by: RADIOLOGY

## 2017-01-11 PROCEDURE — 73110 X-RAY EXAM OF WRIST: CPT | Mod: 26,RT,, | Performed by: RADIOLOGY

## 2017-01-11 PROCEDURE — 73080 X-RAY EXAM OF ELBOW: CPT | Mod: TC,PO,RT

## 2017-01-11 PROCEDURE — 73110 X-RAY EXAM OF WRIST: CPT | Mod: TC,PO,RT

## 2017-01-11 PROCEDURE — 73080 X-RAY EXAM OF ELBOW: CPT | Mod: 26,RT,, | Performed by: RADIOLOGY

## 2017-01-11 PROCEDURE — 73502 X-RAY EXAM HIP UNI 2-3 VIEWS: CPT | Mod: TC,PO,LT

## 2017-01-11 PROCEDURE — 1160F RVW MEDS BY RX/DR IN RCRD: CPT | Mod: S$GLB,,, | Performed by: PHYSICIAN ASSISTANT

## 2017-01-11 PROCEDURE — 99999 PR PBB SHADOW E&M-EST. PATIENT-LVL V: CPT | Mod: PBBFAC,,, | Performed by: PHYSICIAN ASSISTANT

## 2017-01-11 PROCEDURE — 1157F ADVNC CARE PLAN IN RCRD: CPT | Mod: S$GLB,,, | Performed by: PHYSICIAN ASSISTANT

## 2017-01-11 PROCEDURE — 1125F AMNT PAIN NOTED PAIN PRSNT: CPT | Mod: S$GLB,,, | Performed by: PHYSICIAN ASSISTANT

## 2017-01-11 PROCEDURE — 73610 X-RAY EXAM OF ANKLE: CPT | Mod: TC,PO,LT

## 2017-01-11 PROCEDURE — 1159F MED LIST DOCD IN RCRD: CPT | Mod: S$GLB,,, | Performed by: PHYSICIAN ASSISTANT

## 2017-01-11 PROCEDURE — 73090 X-RAY EXAM OF FOREARM: CPT | Mod: TC,PO,RT

## 2017-01-11 PROCEDURE — 73502 X-RAY EXAM HIP UNI 2-3 VIEWS: CPT | Mod: 26,LT,, | Performed by: RADIOLOGY

## 2017-01-11 RX ORDER — TRAMADOL HYDROCHLORIDE 50 MG/1
50 TABLET ORAL EVERY 6 HOURS PRN
Qty: 40 TABLET | Refills: 0 | Status: SHIPPED | OUTPATIENT
Start: 2017-01-11 | End: 2017-01-21

## 2017-01-11 NOTE — PROGRESS NOTES
"Subjective:    Patient ID: Abel Beckham is a 71 y.o. male.    Chief Complaint: Arm Pain (fell cut on right arm); Ankle Injury (left ankle ); and Hip Pain (left hip pain)    Arm Pain    The incident occurred less than 1 hour ago. Incident location: at parking lot of restaurant. The injury mechanism was a fall. The pain is present in the right forearm, right elbow, right wrist and right hand. The pain is at a severity of 10/10. Nothing aggravates the symptoms. He has tried acetaminophen for the symptoms.   Ankle Injury    The pain is present in the left ankle. Associated symptoms include an inability to bear weight. He reports no foreign bodies present. Nothing aggravates the symptoms. He has tried nothing for the symptoms. The treatment provided no relief.   Hip Pain    The injury mechanism was a fall. The pain is present in the left hip. Associated symptoms include an inability to bear weight. He reports no foreign bodies present. Nothing aggravates the symptoms. He has tried nothing for the symptoms. The treatment provided no relief.     Review of Systems   Constitutional: Negative for chills and fever.   HENT: Negative for ear pain and rhinorrhea.    Respiratory: Negative for cough and wheezing.    Gastrointestinal: Negative for diarrhea and vomiting.   Musculoskeletal: Positive for arthralgias and myalgias.     Objective:     Visit Vitals    BP (!) 160/80 (BP Location: Left arm, Patient Position: Sitting, BP Method: Manual)    Pulse 80    Temp 98.3 °F (36.8 °C) (Tympanic)    Resp (!) 22    Ht 5' 10" (1.778 m)    Wt 67.5 kg (148 lb 14.7 oz)    SpO2 98%    BMI 21.37 kg/m2       Physical Exam   Constitutional: He is oriented to person, place, and time. He appears well-developed and well-nourished.   HENT:   Head: Normocephalic and atraumatic.   Right Ear: External ear normal.   Left Ear: External ear normal.   Nose: Nose normal.   Neck: Normal range of motion. Neck supple.   Musculoskeletal:       "  Right forearm: He exhibits tenderness.        Arms:       Right hand: He exhibits normal capillary refill. Decreased strength noted.        Left foot: There is tenderness.   Neurological: He is alert and oriented to person, place, and time.   Skin: Skin is warm and dry.   Nursing note and vitals reviewed.    Assessment:     1. Fall, initial encounter    2. Multiple contusions    3. Multiple abrasions      Plan:   Fall, initial encounter  -     X-Ray Ankle Complete Left; Future; Expected date: 1/11/17  Findings: There is no radiographic evidence of acute osseous, articular, or soft tissue abnormality. Ankle mortise is preserved.    -     X-Ray Hip 2 View Left; Future; Expected date: 1/11/17  Findings: There is no radiographic evidence of acute osseous, articular, or soft tissue abnormality. Joint spaces are well preserved.    -     X-Ray Forearm Right; Future; Expected date: 1/11/17  Findings: There is no radiographic evidence of acute osseous, articular, or soft tissue abnormality.    -     X-Ray Elbow Complete Right; Future; Expected date: 1/11/17  Findings: There is no radiographic evidence of acute osseous, articular, or soft tissue abnormality. Very mild degenerative changes present at the ulnohumeral and radiohumeral articulations. No joint effusion visualized.    -     X-Ray Wrist Complete Right; Future; Expected date: 1/11/17  Findings: There is no radiographic evidence of acute osseous, articular, or soft tissue abnormality. Mild degenerative findings noted at the 1st CMC joint.  No erosive osseous changes demonstrated.    -     X-Ray Hand Complete Right; Future; Expected date: 1/11/17  Findings: There is no radiographic evidence of acute osseous, articular, or soft tissue abnormality. Scattered multiarticular arthritic findings appear unchanged.  No erosive osseous changes demonstrated.    -     tramadol (ULTRAM) 50 mg tablet; Take 1 tablet (50 mg total) by mouth every 6 (six) hours as needed for Pain.   Dispense: 40 tablet; Refill: 0    Multiple contusions  -     tramadol (ULTRAM) 50 mg tablet; Take 1 tablet (50 mg total) by mouth every 6 (six) hours as needed for Pain.  Dispense: 40 tablet; Refill: 0  Rest  Ice pack 3-4 times a day   Compression: ace bandage for ankle  Elevation as needed  Tylenol for pain as needed    Multiple abrasions    Abrasions irrigated with betadine and sterile water then dressed with neosporin and non adhesive bandage  Keep skin clean and dry.  Use antibacterial mild soap to clean wound daily  Do not soak wound in water, do not wash dishes or take tub baths where dirty water can enter the wound  May use neosporin twice daily  Keep covered when leaving the house and going into public areas    Follow up sooner if any signs of infection including redness, swelling, warmth, drainage, or any worsening or delayed wound healing.    Sprain of left ankle, unspecified ligament, initial encounter    Rest, Ice, Compression, Elevation  Left ankle wrapped in ace bandage    Reviewed xray results with patient    If symptoms worsen or fail to improve, follow-up with primary care doctor or nearest ER. After visit summary given and discussed.  Patient verbalized understanding and agrees with treatment plan.  Patient remained stable and was discharged in no acute distress.

## 2017-01-11 NOTE — MR AVS SNAPSHOT
[facility]  [address]  [place] [address]  Phone: [phone]  Fax: [phone]                  Abel Beckham   2017 11:10 AM   Office Visit    Description:  Male : 1945   Provider:  Jessica Kincaid PA-C   Department:  [facility]           Reason for Visit     Arm Pain     Ankle Injury     Hip Pain           Diagnoses this Visit        Comments    Fall, initial encounter    -  Primary     Multiple contusions                To Do List           Goals (5 Years of Data)     None       These Medications        Disp Refills Start End    tramadol (ULTRAM) 50 mg tablet 40 tablet 0 2017    Take 1 tablet (50 mg total) by mouth every 6 (six) hours as needed for Pain. - Oral    Pharmacy: [facility]  [facility].  #: [phone]         [facility] On Call     [facility] On Call Nurse Care Line -  Assistance  Registered nurses in the [facility] On Call Center provide clinical advisement, health education, appointment booking, and other advisory services.  Call for this free service at [phone].             Medications           Message regarding Medications     Verify the changes and/or additions to your medication regime listed below are the same as discussed with your clinician today.  If any of these changes or additions are incorrect, please notify your healthcare provider.        START taking these NEW medications        Refills    tramadol (ULTRAM) 50 mg tablet 0    Sig: Take 1 tablet (50 mg total) by mouth every 6 (six) hours as needed for Pain.    Class: Print    Route: Oral           Verify that the below list of medications is an accurate representation of the medications you are currently taking.  If none reported, the list may be blank. If incorrect, please contact your healthcare provider. Carry this list with you in case of emergency.           Current Medications      "amlodipine (NORVASC) 10 MG tablet Take 10 mg by mouth once daily.    buPROPion (WELLBUTRIN) 100 MG tablet Take 1 tablet (100 mg total) by mouth once daily.    chlorhexidine (HIBICLENS) 4 % external liquid Apply topically daily as needed.    cyanocobalamin 1,000 mcg/mL injection 1000 micrograms subcutaneously daily x7 days, once weekly x4 weeks and once monthly for life.    difluprednate (DUREZOL) 0.05 % Drop ophthalmic solution Place 1 drop into the left eye 4 (four) times daily.    DIPHENHYDRAMINE HCL (BENADRYL ORAL) Take 1 tablet by mouth as needed.    fludrocortisone (FLORINEF) 0.1 mg Tab Take 1 tablet (100 mcg total) by mouth once daily.    gabapentin (NEURONTIN) 300 MG capsule 2400 per day with max 3600    glucagon (human recombinant) inj 1mg/mL kit Inject 1 mL (1 mg total) into the muscle as needed (for low blood sugar).    indomethacin (INDOCIN) 25 MG capsule Take 1 capsule (25 mg total) by mouth 3 (three) times daily with meals.    insulin aspart (NOVOLOG) 100 unit/mL InPn pen Inject 9 Units into the skin 3 (three) times daily with meals. Plus sliding scale.    insulin detemir (LEVEMIR FLEXTOUCH) 100 unit/mL (3 mL) SubQ InPn pen Inject 21 Units into the skin every evening.    insulin needles, disposable, (BD ULTRA-FINE CONSUELO PEN NEEDLES) 32 x 5/32 " Ndle Inject 1 each into the skin 4 (four) times daily.    insulin NPH human recomb 100 unit/mL (3 mL) InPn Inject 9 Units into the skin every evening.    KIONEX 15 gram/60 mL Susp as needed.     multivitamin (THERAGRAN) tablet Take 1 tablet by mouth once daily.    mycophenolate (MYFORTIC) 180 MG TbEC Take 1 tablet (180 mg total) by mouth 2 (two) times daily. Liver Transplant ICD 10 code Z94.4    neomycin-polymyxin-hydrocortisone (CORTISPORIN) 3.5-10,000-1 mg/mL-unit/mL-% otic suspension Place 3 drops into the right ear 3 (three) times daily.    ondansetron (ZOFRAN) 4 MG tablet Take 1 tablet (4 mg total) by mouth every 8 (eight) hours as needed.    " "oxycodone-acetaminophen (PERCOCET) 7.5-325 mg per tablet Take 1 tablet by mouth every 4 (four) hours as needed for Pain.    pantoprazole (PROTONIX) 40 MG tablet Take 1 tablet (40 mg total) by mouth 2 (two) times daily.    pen needle, diabetic 32 gauge x 5/32" Ndle 1 each by Misc.(Non-Drug; Combo Route) route 5 (five) times daily.    predniSONE (DELTASONE) 5 MG tablet Take 1 tablet (5 mg total) by mouth once daily.    propranolol (INDERAL) 20 MG tablet Take 1 tablet (20 mg total) by mouth 3 (three) times daily.    quetiapine (SEROQUEL) 25 MG Tab Take 25 mg by mouth once daily.    ropinirole (REQUIP) 1 MG tablet Take 1 tablet (1 mg total) by mouth once daily.    tacrolimus (PROGRAF) 1 MG Cap Take 1 capsule (1 mg total) by mouth every 12 (twelve) hours.    tamsulosin (FLOMAX) 0.4 mg Cp24 Take 1 capsule (0.4 mg total) by mouth every evening.    albuterol-ipratropium 2.5mg-0.5mg/3mL (DUO-NEB) 0.5 mg-3 mg(2.5 mg base)/3 mL nebulizer solution Take 3 mLs by nebulization every 4 (four) hours as needed for Wheezing.    tramadol (ULTRAM) 50 mg tablet Take 1 tablet (50 mg total) by mouth every 6 (six) hours as needed for Pain.           Clinical Reference Information           Vital Signs - Last Recorded  Most recent update: 1/11/2017 11:16 AM by Yajaira Salazar LPN    BP Pulse Temp Resp    (!) 160/80 (BP Location: Left arm, Patient Position: Sitting, BP Method: Manual) 80 98.3 °F (36.8 °C) (Tympanic) (!) 22    Ht Wt SpO2 BMI    5' 10" (1.778 m) 67.5 kg (148 lb 14.7 oz) 98% 21.37 kg/m2      Blood Pressure          Most Recent Value    BP  (!)  160/80      Allergies as of 1/11/2017     Bactrim  [Sulfamethoxazole-trimethoprim]    Codeine    Sulfa (Sulfonamide Antibiotics)    Clindamycin      Immunizations Administered on Date of Encounter - 1/11/2017     None      Orders Placed During Today's Visit     Future Labs/Procedures Expected by Expires    X-Ray Ankle Complete Left  1/11/2017 1/11/2018    X-Ray Elbow Complete Right  " 1/11/2017 1/11/2018    X-Ray Forearm Right  1/11/2017 1/11/2018    X-Ray Hand Complete Right  1/11/2017 1/11/2018    X-Ray Hip 2 View Left  1/11/2017 1/11/2018    X-Ray Wrist Complete Right  1/11/2017 1/11/2018      Maintenance Dialysis History     Patient has no recorded history of maintenance dialysis.      Transplant Information        Txp Date Organ Coordinator Care Team    10/8/2013 Liver Eulalia Shi RN Surgeon:  Jay Devi MD   Referring Physician:  Isaac Watkins MD   Assisting Surgeon:  Abel Chance MD   Current Hepatologist:  Harriet Westfall MD   Corresponding Physician:  Suresh Ayoub MD   Current PCP:  Suresh Ayoub MD         Instructions    Rest  Ice pack 3-4 times a day   Compression: ace bandage for ankle  Elevation as needed  Tylenol for pain as needed      Keep skin clean and dry.  Use antibacterial mild soap to clean wound daily  Do not soak wound in water, do not wash dishes or take tub baths where dirty water can enter the wound  May use neosporin twice daily  Keep covered when leaving the house and going into public areas    Follow up sooner if any signs of infection including redness, swelling, warmth, drainage, or any worsening or delayed wound healing.      Follow up with Ortho or Primary Care Physician if any worsening in symptoms or no improvement after 2 weeks.              Bruises (Contusions)    A contusion is a bruise. A bruise happens when a blow to your body doesn't break the skin but does break blood vessels beneath the skin. Blood leaking from the broken vessels causes redness and swelling. As it heals, your bruise is likely to turn colors like purple, green, and yellow. This is normal. The bruise should fade in 2 or 3 weeks.  Factors that make you more likely to bruise  Almost everyone bruises now and then. Certain people do bruise more easily than others. You're more prone to bruising as you get older. That's because blood vessels become more fragile with age. You're  also more likely to bruise if you have a clotting disorder such as hemophilia or take medications that reduce clotting, including aspirin.  When to go to the emergency room (ER)  Bruises almost always heal on their own without special treatment. But for some people, a bad bruise can be serious. Seek medical care if you:  · Have a clotting disorder such as hemophilia.  · Have cirrhosis or other serious liver disease.  · Take blood-thinning medications such as warfarin (Coumadin).  What to expect in the ER  A doctor will examine your bruise and ask about any health conditions you have. In some cases, you may have a test to check how well your blood clots. Other treatment will depend on your needs.  Follow-up care  Sometimes a bruise gets worse instead of better. It may become larger and more swollen. This can occur when your body walls off a small pool of blood under the skin (hematoma). In very rare cases, your doctor may need to drain excess blood from the area.  Tip:  Apply an ice pack or bag of frozen peas to a bruise (keep a thin cloth between the cold source and your skin). This can help reduce redness and swelling.   © 9752-9233 [employer]. [address] [place] [address], [place] [address]. All rights reserved. This information is not intended as a substitute for professional medical care. Always follow your healthcare professional's instructions.

## 2017-01-11 NOTE — PATIENT INSTRUCTIONS
Rest  Ice pack 3-4 times a day   Compression: ace bandage for ankle  Elevation as needed  Tylenol for pain as needed      Keep skin clean and dry.  Use antibacterial mild soap to clean wound daily  Do not soak wound in water, do not wash dishes or take tub baths where dirty water can enter the wound  May use neosporin twice daily  Keep covered when leaving the house and going into public areas    Follow up sooner if any signs of infection including redness, swelling, warmth, drainage, or any worsening or delayed wound healing.      Follow up with Ortho or Primary Care Physician if any worsening in symptoms or no improvement after 2 weeks.              Bruises (Contusions)    A contusion is a bruise. A bruise happens when a blow to your body doesn't break the skin but does break blood vessels beneath the skin. Blood leaking from the broken vessels causes redness and swelling. As it heals, your bruise is likely to turn colors like purple, green, and yellow. This is normal. The bruise should fade in 2 or 3 weeks.  Factors that make you more likely to bruise  Almost everyone bruises now and then. Certain people do bruise more easily than others. You're more prone to bruising as you get older. That's because blood vessels become more fragile with age. You're also more likely to bruise if you have a clotting disorder such as hemophilia or take medications that reduce clotting, including aspirin.  When to go to the emergency room (ER)  Bruises almost always heal on their own without special treatment. But for some people, a bad bruise can be serious. Seek medical care if you:  · Have a clotting disorder such as hemophilia.  · Have cirrhosis or other serious liver disease.  · Take blood-thinning medications such as warfarin (Coumadin).  What to expect in the ER  A doctor will examine your bruise and ask about any health conditions you have. In some cases, you may have a test to check how well your blood clots. Other  treatment will depend on your needs.  Follow-up care  Sometimes a bruise gets worse instead of better. It may become larger and more swollen. This can occur when your body walls off a small pool of blood under the skin (hematoma). In very rare cases, your doctor may need to drain excess blood from the area.  Tip:  Apply an ice pack or bag of frozen peas to a bruise (keep a thin cloth between the cold source and your skin). This can help reduce redness and swelling.   © 2139-7321 [employer]. [address] [place] [address], [place] [address]. All rights reserved. This information is not intended as a substitute for professional medical care. Always follow your healthcare professional's instructions.

## 2017-01-13 ENCOUNTER — TELEPHONE (OUTPATIENT)
Dept: TRANSPLANT | Facility: CLINIC | Age: 72
End: 2017-01-13

## 2017-01-13 NOTE — TELEPHONE ENCOUNTER
Spoke with pt's wife. States they moved to [place] and [facility]  Wife will let me know the name and number of their  and when pt will be able to schedule labs.

## 2017-02-10 ENCOUNTER — TELEPHONE (OUTPATIENT)
Dept: TRANSPLANT | Facility: CLINIC | Age: 72
End: 2017-02-10

## 2017-02-10 NOTE — TELEPHONE ENCOUNTER
Called pt's wife to get an update on pt's [facility] app[facility]. She states that the [facility] cancelled [facility] and that he is rescheduled for 2/15. She states [facility]. Verified new demographic info, and documented. Msg sent on [id number] for pt to provide new  and contact info so that we can receive lab orders and contact  as needed.

## 2017-02-15 ENCOUNTER — TELEPHONE (OUTPATIENT)
Dept: TRANSPLANT | Facility: CLINIC | Age: 72
End: 2017-02-15

## 2017-02-15 NOTE — TELEPHONE ENCOUNTER
"Returned call and spoke with pt's wife.  States [facility], Dr. Bell.  States we need to get in touch with "team two nurse" to collaborate labs and care.      Phone provided - [phone]  Fax provided - [phone]    Called phone number, unable to speak with team two nurse.  Sent fax requesting phone call to our office.   "

## 2017-02-15 NOTE — TELEPHONE ENCOUNTER
----- Message from Yuly Martinez sent at 2/15/2017  4:08 PM CST -----  Contact: spouse  Asking for a call back, returning a call. [phone]

## 2017-02-17 DIAGNOSIS — E11.9 TYPE 2 DIABETES MELLITUS WITHOUT COMPLICATION: ICD-10-CM

## 2017-03-01 ENCOUNTER — TELEPHONE (OUTPATIENT)
Dept: TRANSPLANT | Facility: CLINIC | Age: 72
End: 2017-03-01

## 2017-03-01 NOTE — TELEPHONE ENCOUNTER
Spoke with , Aurora Lo RN at the [facility].  Pt under Dr. Bell, team 1.  Requested that I send fax with lab request.

## 2017-03-01 NOTE — LETTER
2017    Abel Beckham  [address] [facility] [address]             [facility] - Liver Transplant  [facility]  [place] [address]  Phone: [phone] To Whom it May Concern:    Patient Abel Beckham  1945 received a liver transplant at our facility 10/8/2013.    Please send current lab results for Dr. Harriet Westfall to review.  I also attached a lab order.  Once we see current lab results, we will fax a request for a new set of labs to be drawn.     Sincerely,      Eulalia Shi, RN, BSN  Liver Transplant Coordinator

## 2017-03-02 ENCOUNTER — TELEPHONE (OUTPATIENT)
Dept: TRANSPLANT | Facility: CLINIC | Age: 72
End: 2017-03-02

## 2017-03-02 LAB
EXT ALBUMIN: 4.1
EXT ALKALINE PHOSPHATASE: 93
EXT ALT: 47
EXT AST: 31
EXT BILIRUBIN TOTAL: 0.8
EXT BUN: 24
EXT CALCIUM: 9.1
EXT CHLORIDE: 105
EXT CHOLESTEROL: 177
EXT CO2: 28
EXT CREATININE: 1.6 MG/DL
EXT GGT: 160
EXT GLUCOSE: 114
EXT HDL: 58
EXT HEMOGLOBIN A1C: 5.2
EXT LDL CHOLESTEROL: 103
EXT POTASSIUM: 5
EXT SODIUM: 143 MMOL/L
EXT TACROLIMUS LVL: NORMAL
EXT TRIGLYCERIDES: 82

## 2017-03-02 NOTE — TELEPHONE ENCOUNTER
Called pt's wife to let her know that we received [facility] the [facility]. We would like pt to have next labs done 3/13/17. Faxed order to pt's [facility] , Aurora. So that she can schedule labs for pt.

## 2017-03-03 ENCOUNTER — TELEPHONE (OUTPATIENT)
Dept: TRANSPLANT | Facility: CLINIC | Age: 72
End: 2017-03-03

## 2017-03-03 NOTE — TELEPHONE ENCOUNTER
----- Message from Harriet Westfall MD sent at 3/3/2017  2:07 PM CST -----  Proceed with transplant labs on the 13th.

## 2017-04-28 ENCOUNTER — TELEPHONE (OUTPATIENT)
Dept: TRANSPLANT | Facility: CLINIC | Age: 72
End: 2017-04-28

## 2017-04-28 LAB
EXT ALKALINE PHOSPHATASE: 136
EXT ALKALINE PHOSPHATASE: 63
EXT ALT: 13
EXT ALT: 51
EXT AST: 17
EXT AST: 20
EXT BILIRUBIN TOTAL: 0.8
EXT BILIRUBIN TOTAL: 1
EXT BUN: 20
EXT BUN: 20
EXT CALCIUM: 9.5
EXT CALCIUM: 9.8
EXT CHLORIDE: 102
EXT CHLORIDE: 99
EXT CO2: 27
EXT CO2: 29
EXT CREATININE: 1.7 MG/DL
EXT CREATININE: 1.9 MG/DL
EXT GGT: 43
EXT GLUCOSE: 129
EXT GLUCOSE: 529
EXT HEMATOCRIT: 29.4
EXT HEMATOCRIT: 32.8
EXT HEMOGLOBIN: 10.7
EXT HEMOGLOBIN: 9.9
EXT LYMPH%: 15.8
EXT LYMPH%: 29.4
EXT MONOCYTES%: 3.6
EXT MONOCYTES%: 4.4
EXT PLATELETS: 115
EXT PLATELETS: 134
EXT POTASSIUM: 4.3
EXT POTASSIUM: 5.9
EXT SEGS%: 66.2
EXT SEGS%: 80
EXT SODIUM: 136 MMOL/L
EXT SODIUM: 143 MMOL/L
EXT TACROLIMUS LVL: 3.7
EXT TACROLIMUS LVL: NORMAL
EXT WBC: 4.6
EXT WBC: 5.6

## 2017-04-28 NOTE — TELEPHONE ENCOUNTER
Spoke with pt's wife.  States pt is seeing nephrologist regularly and has had repeat labs since 4/13 with improved Cr and blood sugars. [facility] from [facility]. Wife states pt is repeating labs 5/12/17 for [facility] MDs.  Sent fax request for tx labs with [facility] labs.

## 2017-04-28 NOTE — TELEPHONE ENCOUNTER
----- Message from Harriet Westfall MD sent at 4/28/2017  2:34 PM CDT -----  I assume PCP or  Who ordered the test handled the glucose and elevated potassium?  Repeat transplant labs when expected due

## 2017-04-28 NOTE — TELEPHONE ENCOUNTER
Called [facility] to [facility] results.  RN requested I send a fax with request.  Fax sent.  Awaiting lab results.

## 2017-05-16 ENCOUNTER — TELEPHONE (OUTPATIENT)
Dept: TRANSPLANT | Facility: CLINIC | Age: 72
End: 2017-05-16

## 2017-05-16 NOTE — TELEPHONE ENCOUNTER
Spoke with [facility] coordinator. No labs since 4/13/17.      Called pt and spouse. No answer. LVM, requesting return call to discuss labs needed.

## 2017-05-29 ENCOUNTER — PATIENT MESSAGE (OUTPATIENT)
Dept: GASTROENTEROLOGY | Facility: CLINIC | Age: 72
End: 2017-05-29

## 2017-06-02 NOTE — TELEPHONE ENCOUNTER
Called patient and apparently has lost his voice so answers questions he is wife.  Also asked for up-to-date about transferring his care.  He reports he does have an appointment set up with a kidney doctor and a [facility] at [facility] in about 2 weeks.  He will follow-up with us regarding those appointments.

## 2017-06-29 ENCOUNTER — TELEPHONE (OUTPATIENT)
Dept: TRANSPLANT | Facility: CLINIC | Age: 72
End: 2017-06-29

## 2017-06-29 LAB
EXT ALBUMIN: 3.9
EXT ALKALINE PHOSPHATASE: 55
EXT ALT: 16
EXT AST: 25
EXT BASOPHIL%: 0.5
EXT BILIRUBIN TOTAL: 1.1
EXT BUN: 27
EXT CALCIUM: 8.8
EXT CHLORIDE: 103
EXT CO2: 25
EXT CREATININE: 2 MG/DL
EXT EOSINOPHIL%: 1.2
EXT GGT: 28
EXT GLUCOSE: 140
EXT HEMATOCRIT: 29.9
EXT HEMOGLOBIN: 9.9
EXT INR: 1.1
EXT LYMPH%: 24.8
EXT MAGNESIUM: 2.5
EXT MONOCYTES%: 7.2
EXT PLATELETS: 115
EXT POTASSIUM: 4.5
EXT PT: 13.7
EXT SEGS%: 66.3
EXT SODIUM: 137 MMOL/L
EXT TACROLIMUS LVL: 2
EXT WBC: 4.8

## 2017-06-29 NOTE — TELEPHONE ENCOUNTER
----- Message from Loretta Pope sent at 6/29/2017  2:35 PM CDT -----  Contact: Melissa arango [facility]  Requesting a return call

## 2017-06-29 NOTE — TELEPHONE ENCOUNTER
Returned call, spoke with Melissa.  States Dr[facility] Marcos[facility]. Labs drawn and will be faxed. Dr. Marcos requesting Op note and last progress note.

## 2017-06-30 ENCOUNTER — TELEPHONE (OUTPATIENT)
Dept: TRANSPLANT | Facility: CLINIC | Age: 72
End: 2017-06-30

## 2017-06-30 NOTE — TELEPHONE ENCOUNTER
Pt and spouse advised via portal of stable labs and that no medication changes are needed. Repeat labs due 7/24/17.    Letter sent as a reminder.

## 2017-06-30 NOTE — TELEPHONE ENCOUNTER
----- Message from Harriet Westfall MD sent at 6/29/2017  5:06 PM CDT -----  Tacrolimus level is low but liver tests look okay.  Given patient's medical complexity I agree that he needs to transfer his care to local facility.  In the meantime repeat labs in 4 weeks

## 2017-06-30 NOTE — LETTER
June 30, 2017    Abel Beckham  [address] [place] [id number]          Dear Able Martinezdwell:  MRN: [id number]    Your lab results were stable.  There are no medicine changes.  Please have your labs drawn again on 7/24/17.      If you cannot have your labs drawn on the scheduled date, it is your responsibility to call the transplant department to reschedule.  To reschedule or make an appointment, please as to speak to or leave a message for my assistant, Anna Lance, at [phone].  When leaving a message for Casi Castillo, or myself, we ask that you leave a brief message regarding your request.    Sincerely,    Eulalia Shi, RN, BSN  Liver Transplant Coordinator  [facility]  [address] [place] [id number]  [phone]

## 2017-08-07 ENCOUNTER — TELEPHONE (OUTPATIENT)
Dept: TRANSPLANT | Facility: CLINIC | Age: 72
End: 2017-08-07

## 2017-08-07 NOTE — TELEPHONE ENCOUNTER
"Spoke with pt's wife regarding transfer of care. States she can't remember who they [facility] but states they were told that "no one from [facility] has called them back and that we are holding up his transfer."  Advised wife that I spoke with JAMIL Torres at Dr. Marcos's office at the end of June and sent her records on 6/29/17. Wife stated that is not what they are being told.  However, wife unsure of who told her this because she could not remember any doctors names or numbers.  Wife also unsure of when last labs were drawn.  Wife was unwilling to find name or number and stated I "would just have to wait until his next appointment for the information."    Called Melissa at Dr. Marcos's office. No answer, LVM with tx line, my direct number and our fax number.  Requesting return call asap.     Notified Dr. Westfall of above.  "

## 2017-08-10 ENCOUNTER — TELEPHONE (OUTPATIENT)
Dept: TRANSPLANT | Facility: CLINIC | Age: 72
End: 2017-08-10

## 2017-08-10 NOTE — TELEPHONE ENCOUNTER
Called pt's wife to relay what was discussed and to advise her that Dr. Westfall wanted to speak with the pt.  No answer, LVM.

## 2017-08-10 NOTE — TELEPHONE ENCOUNTER
[facility]oke with [facility] .  Per , [facility] under [facility] board certified transplant hepatologist.  CM states we are not allowed to have pt's records or labs unless pt agrees and consents. Per , they cannot do UNOS reporting. CM stated that they cannot make pt follow with [facility] if he chooses to follow with them. I explained to CM that we are ok with pt transferring care to a transplant center. We want to make sure the pt is followed closely. We also wanted to make sure his UNOS reports were completed.  CM stated they cannot do this. Advised CM that we can do UNOS reporting as long as we have documentation of the pt's follow-up. CM stated they could not send us that information because pt was choosing to be seen by Dr. Marcos. Attempted to provide CM with Dr. Westfall's cell number and requested she ask if Dr. Marcos could contact Dr. Westfall at her convenience to discuss this.  CM refused to take number. She stated that Dr. Westfall and I could speak with the pt regarding what he wants to do. Call was disconnected when CM started to be inappropriate on the phone.    Spoke with Dr. Westfall. Discussed conversation with CM. Discussed that CM was unwilling to listen or assist or take her number down for Dr. Marcos to call her.  Dr. Westfall stated she would call the pt to discuss because he either needs to follow with us or transfer care completely.

## 2017-08-31 ENCOUNTER — TELEPHONE (OUTPATIENT)
Dept: TRANSPLANT | Facility: CLINIC | Age: 72
End: 2017-08-31

## 2017-08-31 NOTE — TELEPHONE ENCOUNTER
Unable to reach pt by phone to discuss follow-up care.    Letter sent requesting call to the office asap.

## 2017-08-31 NOTE — LETTER
August 31, 2017    Abel Beckham  [address] [place] [id number]          Dear Abel Martinezdwell:  MRN: [id number]    Your lab work was due to be redrawn back in July.  You have not had a prograf level drawn since June, and that level was very low.  As you discussed with Dr. Westfall in August, we need a better method of follow-up for your liver transplant.  We have not been successful with getting [facility] to [facility]. We have tried calling you at home to discuss again, but we have not been able to speak to you or your wife. Please call as soon as possible to let us know your plan.    I have attached a lab order. If you wish for us to still be involved in your care, we will need fasting labs as soon as you can get them.     Sincerely,    Eulalia Shi, RN, BSN  Liver Transplant Coordinator  [facility]  [address] [place] [id number]  [phone]

## 2017-10-13 ENCOUNTER — TELEPHONE (OUTPATIENT)
Dept: TRANSPLANT | Facility: CLINIC | Age: 72
End: 2017-10-13

## 2017-10-13 NOTE — LETTER
October 13, 2017    Abel Beckham  [address] [facility] [address]             [facility]  [address]  [place] [address]  Phone: [phone] Mr. Beckham,    We have been trying to contact you regarding your post-transplant follow-up care. As previously discussed, we are unable to get labs through your [facility]. The last set of full labs we have are from 6/2017. In order to continue to follow you and prescribe your medication, you are required to have labs at least every 12 weeks.     We have called and sent letters to you but have never heard back.  Please call our office as soon as possible to let us know your plan and if you have transferred your care.     Sincerely,      Eulalia Shi, RN, BSN  Liver Transplant Coordinator

## 2017-10-13 NOTE — TELEPHONE ENCOUNTER
No labs received.  No phone calls to office to discuss follow-up care as requested.     Certified letter sent.

## 2017-11-13 ENCOUNTER — TELEPHONE (OUTPATIENT)
Dept: TRANSPLANT | Facility: CLINIC | Age: 72
End: 2017-11-13

## 2017-11-13 NOTE — TELEPHONE ENCOUNTER
----- Message from Harriet Westfall MD sent at 11/13/2017  6:52 AM CST -----  You can make him LTF  ----- Message -----  From: Eulalia Shi RN  Sent: 11/8/2017   2:42 PM  To: Harriet Westfall MD    No contact with patient or spouse.  Last labs 6/5/2017. I sent them a certified letter, which I verified today was received and signed for.  Normally, once this happens we make the patient lost to follow.  Do you want to reach out one more time or is it ok to make him LTF?    Thanks,    Eulalia

## 2021-08-31 NOTE — TELEPHONE ENCOUNTER
At request of Dr Ruddy Choi and DEBORAH Torres, called pt to coordinate apt with hepatologist Dr. Radha Lafleur for consult regarding elevated liver enzymes prior to starting hepatotoxic chemotherapy regimen.  Pt was upset about additional delay that this may cause Called pt, no answer. LVM requesting return call to discuss lab results and Dr. Westfall's recommendation.

## 2022-05-11 ENCOUNTER — PATIENT MESSAGE (OUTPATIENT)
Dept: RESEARCH | Facility: CLINIC | Age: 77
End: 2022-05-11
Payer: MEDICARE